# Patient Record
Sex: MALE | Race: WHITE
[De-identification: names, ages, dates, MRNs, and addresses within clinical notes are randomized per-mention and may not be internally consistent; named-entity substitution may affect disease eponyms.]

---

## 2018-03-12 ENCOUNTER — HOSPITAL ENCOUNTER (INPATIENT)
Dept: HOSPITAL 17 - NEPD | Age: 48
LOS: 10 days | Discharge: NURSING, REHAB OR LONG-TERM CARE | DRG: 885 | End: 2018-03-22
Attending: PSYCHIATRY & NEUROLOGY | Admitting: PSYCHIATRY & NEUROLOGY
Payer: MEDICARE

## 2018-03-12 VITALS — WEIGHT: 145.73 LBS | HEIGHT: 67 IN | BODY MASS INDEX: 22.87 KG/M2

## 2018-03-12 VITALS
OXYGEN SATURATION: 98 % | TEMPERATURE: 98.7 F | SYSTOLIC BLOOD PRESSURE: 124 MMHG | DIASTOLIC BLOOD PRESSURE: 83 MMHG | HEART RATE: 74 BPM | RESPIRATION RATE: 18 BRPM

## 2018-03-12 DIAGNOSIS — R25.1: ICD-10-CM

## 2018-03-12 DIAGNOSIS — Z81.8: ICD-10-CM

## 2018-03-12 DIAGNOSIS — E78.5: ICD-10-CM

## 2018-03-12 DIAGNOSIS — Z81.0: ICD-10-CM

## 2018-03-12 DIAGNOSIS — F20.9: Primary | ICD-10-CM

## 2018-03-12 DIAGNOSIS — E03.9: ICD-10-CM

## 2018-03-12 LAB
ALBUMIN SERPL-MCNC: 4.5 GM/DL (ref 3.4–5)
ALP SERPL-CCNC: 90 U/L (ref 45–117)
ALT SERPL-CCNC: 23 U/L (ref 12–78)
APAP SERPL-MCNC: (no result) MCG/ML (ref 10–30)
AST SERPL-CCNC: 21 U/L (ref 15–37)
BASOPHILS # BLD AUTO: 0 TH/MM3 (ref 0–0.2)
BASOPHILS NFR BLD: 0.5 % (ref 0–2)
BILIRUB SERPL-MCNC: 0.6 MG/DL (ref 0.2–1)
BUN SERPL-MCNC: 15 MG/DL (ref 7–18)
CALCIUM SERPL-MCNC: 9.1 MG/DL (ref 8.5–10.1)
CHLORIDE SERPL-SCNC: 104 MEQ/L (ref 98–107)
CREAT SERPL-MCNC: 0.95 MG/DL (ref 0.6–1.3)
EOSINOPHIL # BLD: 0.1 TH/MM3 (ref 0–0.4)
EOSINOPHIL NFR BLD: 1.2 % (ref 0–4)
ERYTHROCYTE [DISTWIDTH] IN BLOOD BY AUTOMATED COUNT: 13.7 % (ref 11.6–17.2)
GFR SERPLBLD BASED ON 1.73 SQ M-ARVRAT: 84 ML/MIN (ref 89–?)
GLUCOSE SERPL-MCNC: 87 MG/DL (ref 74–106)
HCO3 BLD-SCNC: 29.7 MEQ/L (ref 21–32)
HCT VFR BLD CALC: 43.8 % (ref 39–51)
HGB BLD-MCNC: 15.4 GM/DL (ref 13–17)
LYMPHOCYTES # BLD AUTO: 1.2 TH/MM3 (ref 1–4.8)
LYMPHOCYTES NFR BLD AUTO: 23.7 % (ref 9–44)
MCH RBC QN AUTO: 31.4 PG (ref 27–34)
MCHC RBC AUTO-ENTMCNC: 35.2 % (ref 32–36)
MCV RBC AUTO: 89.2 FL (ref 80–100)
MONOCYTE #: 0.3 TH/MM3 (ref 0–0.9)
MONOCYTES NFR BLD: 6.2 % (ref 0–8)
NEUTROPHILS # BLD AUTO: 3.5 TH/MM3 (ref 1.8–7.7)
NEUTROPHILS NFR BLD AUTO: 68.4 % (ref 16–70)
PLATELET # BLD: 165 TH/MM3 (ref 150–450)
PMV BLD AUTO: 8.3 FL (ref 7–11)
PROT SERPL-MCNC: 7.9 GM/DL (ref 6.4–8.2)
RBC # BLD AUTO: 4.91 MIL/MM3 (ref 4.5–5.9)
SODIUM SERPL-SCNC: 142 MEQ/L (ref 136–145)
WBC # BLD AUTO: 5.2 TH/MM3 (ref 4–11)

## 2018-03-12 PROCEDURE — 84443 ASSAY THYROID STIM HORMONE: CPT

## 2018-03-12 PROCEDURE — 80164 ASSAY DIPROPYLACETIC ACD TOT: CPT

## 2018-03-12 PROCEDURE — 80307 DRUG TEST PRSMV CHEM ANLYZR: CPT

## 2018-03-12 PROCEDURE — 85025 COMPLETE CBC W/AUTO DIFF WBC: CPT

## 2018-03-12 PROCEDURE — 80048 BASIC METABOLIC PNL TOTAL CA: CPT

## 2018-03-12 PROCEDURE — 99285 EMERGENCY DEPT VISIT HI MDM: CPT

## 2018-03-12 PROCEDURE — 84439 ASSAY OF FREE THYROXINE: CPT

## 2018-03-12 PROCEDURE — 80061 LIPID PANEL: CPT

## 2018-03-12 PROCEDURE — 84100 ASSAY OF PHOSPHORUS: CPT

## 2018-03-12 PROCEDURE — 93005 ELECTROCARDIOGRAM TRACING: CPT

## 2018-03-12 PROCEDURE — 83735 ASSAY OF MAGNESIUM: CPT

## 2018-03-12 PROCEDURE — 80053 COMPREHEN METABOLIC PANEL: CPT

## 2018-03-12 PROCEDURE — 83036 HEMOGLOBIN GLYCOSYLATED A1C: CPT

## 2018-03-12 NOTE — PD
HPI


Chief Complaint:  Psychiatric Symptoms


Time Seen by Provider:  21:51


Travel History


International Travel<30 days:  No


Contact w/Intl Traveler<30days:  No


Traveled to known affect area:  No





History of Present Illness


HPI


47-year-old white male presents emergency department from his long term under [facility] 

act by his psychologist.  The patient has history of paranoid schizophrenia.  

He is becoming increasingly agitated and aggressive towards staff and 

residents.  The patient according the [facility] act is at risk for self-harm or 

harm to others.  The patient here is a poor historian and unable to render any 

meaningful history.





PFSH


Past Medical History


*** Narrative Medical


Paranoid schizophrenia, hypothyroidism, hypercholesterolemia


Diabetes:  No


Patient Takes Glucophage:  No


Diminished Hearing:  No


Immunizations Current:  Yes


Schizophrenia:  Yes (PARANOID TYPE)


Tetanus Vaccination:  > 5 Years


Influenza Vaccination:  Yes





Past Surgical History


Surgical History:  No Previous Surgery





Social History


Alcohol Use:  No


Tobacco Use:  No


Substance Use:  No





Allergies-Medications


(Allergen,Severity, Reaction):  


Coded Allergies:  


     No Known Allergies (Unverified , 3/12/18)


Reported Meds & Prescriptions





Reported Meds & Active Scripts


Active


Reported


Abilify (Aripiprazole) 10 Mg Tab 10 Mg PO DAILY


Simvastatin 40 Mg Tab 40 Mg PO HS


Propranolol (Propranolol HCl) 10 Mg Tab 10 Mg PO DAILY


Levothyroxine (Levothyroxine Sodium) 25 Mcg Tab 25 Mcg PO DAILY


Latuda (Lurasidone) 80 Mg Tab 80 Mg PO DAILY


Haloperidol 10 Mg Tab 10 Mg PO DAILY


Lexapro (Escitalopram Oxalate) 20 Mg Tab 20 Mg PO DAILY


Aricept (Donepezil HCl) 10 Mg Tablet   


Benztropine (Benztropine Mesylate) 0.5 Mg Tab 2 Mg PO BID








Review of Systems


ROS Limitations:  Poor Historian





Physical Exam


Narrative


GENERAL: Well-nourished, well-developed patient.


SKIN: Warm and dry.


HEAD: Normocephalic and atraumatic.


EYES: No scleral icterus. No injection or drainage. 


ENT: No nasal drainage noted. Mucous membranes pink. Airway patent.


NECK: Supple, trachea midline.  Moves head freely without obvious discomfort.


CARDIOVASCULAR: Regular rate and rhythm without murmurs, gallops, or rubs. 


RESPIRATORY: Breath sounds equal bilaterally. No accessory muscle use.


GASTROINTESTINAL: Abdomen soft, non-tender, nondistended. 


EXTREMITIES: No cyanosis or edema.


BACK: Nontender without obvious deformity. No CVA tenderness.


NEURO: Patient is alert and oriented. no sensorimotor deficits.  Nonfocal.  

Normal speech.


PSYCH: Flat affect.  Patient is guarded.  No auditory or visual hallucinations.





Data


Data


Last Documented VS





Vital Signs








  Date Time  Temp Pulse Resp B/P (MAP) Pulse Ox O2 Delivery O2 Flow Rate FiO2


 


3/12/18 21:46 98.7 74 18 124/83 (97) 98   








Orders





 Orders


Complete Blood Count With Diff (3/12/18 22:00)


Comprehensive Metabolic Panel (3/12/18 22:00)


Thyroid Stimulating Hormone (3/12/18 22:00)


Valproic Acid (Depakene) (3/12/18 22:00)


Psych Screen (3/12/18 22:00)


Drug Screen, Random Urine (3/12/18 22:00)


Alcohol (Ethanol) (3/12/18 22:00)


Salicylates (Aspirin) (3/12/18 22:00)


Tylenol (Acetaminophen) (3/12/18 22:00)





Labs





Laboratory Tests








Test


  3/12/18


20:19 3/12/18


23:38


 


Blood Urea Nitrogen 15 MG/DL  


 


Creatinine 0.95 MG/DL  


 


Random Glucose 87 MG/DL  


 


Total Protein 7.9 GM/DL  


 


Albumin 4.5 GM/DL  


 


Calcium Level 9.1 MG/DL  


 


Alkaline Phosphatase 90 U/L  


 


Aspartate Amino Transf


(AST/SGOT) 21 U/L 


  


 


 


Alanine Aminotransferase


(ALT/SGPT) 23 U/L 


  


 


 


Total Bilirubin 0.6 MG/DL  


 


Sodium Level 142 MEQ/L  


 


Potassium Level 3.6 MEQ/L  


 


Chloride Level 104 MEQ/L  


 


Carbon Dioxide Level 29.7 MEQ/L  


 


Anion Gap 8 MEQ/L  


 


Estimat Glomerular Filtration


Rate 84 ML/MIN 


  


 


 


Thyroid Stimulating Hormone


3rd Gen 5.550 uIU/ML 


  


 


 


Salicylates Level


  LESS THAN 1.7


MG/DL 


 


 


Acetaminophen Level


  LESS THAN 2.0


MCG/ML 


 


 


Valproic Acid (Depakene) Level 4 MCG/ML  


 


Ethyl Alcohol Level


  LESS THAN 3


MG/DL 


 


 


White Blood Count  5.2 TH/MM3 


 


Red Blood Count  4.91 MIL/MM3 


 


Hemoglobin  15.4 GM/DL 


 


Hematocrit  43.8 % 


 


Mean Corpuscular Volume  89.2 FL 


 


Mean Corpuscular Hemoglobin  31.4 PG 


 


Mean Corpuscular Hemoglobin


Concent 


  35.2 % 


 


 


Red Cell Distribution Width  13.7 % 


 


Platelet Count  165 TH/MM3 


 


Mean Platelet Volume  8.3 FL 


 


Neutrophils (%) (Auto)  68.4 % 


 


Lymphocytes (%) (Auto)  23.7 % 


 


Monocytes (%) (Auto)  6.2 % 


 


Eosinophils (%) (Auto)  1.2 % 


 


Basophils (%) (Auto)  0.5 % 


 


Neutrophils # (Auto)  3.5 TH/MM3 


 


Lymphocytes # (Auto)  1.2 TH/MM3 


 


Monocytes # (Auto)  0.3 TH/MM3 


 


Eosinophils # (Auto)  0.1 TH/MM3 


 


Basophils # (Auto)  0.0 TH/MM3 


 


CBC Comment  DIFF FINAL 


 


Differential Comment   











MDM


Medical Decision Making


Medical Screen Exam Complete:  Yes


Emergency Medical Condition:  Yes


Medical Record Reviewed:  Yes


Differential Diagnosis


MDM: High


Differential diagnoses: Schizophrenia, schizoaffective disorder, bipolar, 

anxiety, depression, adjustment reaction, mood disorder NOS, ODD, depressive 

disorder NOS, dementia, dementia with agitation, psychosis NOS, substance 

induced mood disorder, DMDD, Asperger syndrome, infection,electrolyte 

abnormality, malingering.


Narrative Course


Mental health screening discussed with the patient.  Psychiatric screen ordered.





Patient has been medically cleared.





This is medical clearance for psychiatric admission





Diagnosis





 Primary Impression:  


 Medical clearance for psychiatric admission


Condition:  Nick Green Mar 12, 2018 22:07

## 2018-03-13 VITALS
OXYGEN SATURATION: 98 % | SYSTOLIC BLOOD PRESSURE: 116 MMHG | DIASTOLIC BLOOD PRESSURE: 68 MMHG | RESPIRATION RATE: 20 BRPM | HEART RATE: 72 BPM

## 2018-03-13 VITALS
HEART RATE: 79 BPM | DIASTOLIC BLOOD PRESSURE: 77 MMHG | SYSTOLIC BLOOD PRESSURE: 133 MMHG | OXYGEN SATURATION: 100 % | RESPIRATION RATE: 20 BRPM | TEMPERATURE: 97.3 F

## 2018-03-13 VITALS
HEART RATE: 75 BPM | DIASTOLIC BLOOD PRESSURE: 60 MMHG | OXYGEN SATURATION: 97 % | SYSTOLIC BLOOD PRESSURE: 120 MMHG | RESPIRATION RATE: 19 BRPM | TEMPERATURE: 97.3 F

## 2018-03-13 VITALS
HEART RATE: 70 BPM | OXYGEN SATURATION: 97 % | SYSTOLIC BLOOD PRESSURE: 110 MMHG | RESPIRATION RATE: 18 BRPM | DIASTOLIC BLOOD PRESSURE: 60 MMHG

## 2018-03-13 RX ADMIN — NICOTINE SCH PATCH: 21 PATCH, EXTENDED RELEASE TOPICAL at 14:00

## 2018-03-13 RX ADMIN — PRAVASTATIN SODIUM SCH MG: 80 TABLET ORAL at 20:13

## 2018-03-13 RX ADMIN — BENZTROPINE MESYLATE SCH MG: 2 TABLET ORAL at 20:13

## 2018-03-13 NOTE — HHI.HP
Provisional Diagnosis


Admission Date





[place] I.


Chronic paranoid schizophrenia, acute exacerbation


Axis II.


Deferred


Axis III.


Hypothyroidism





                               Certification of Person's Competence 


                           To Provide Express and Informed Consent





I have personally examined Duane Stefaniak , a person being served at [facility] on, Mar 13, 2018 11:22.


Express and informed consent means consent voluntarily given in writing, by a 

competent person, after sufficient explanation and disclosure of the subject 

matter involved to enable the person to make a knowing and willful decision 

without any element of force, fraud, deceit, duress, or other form of 

constraint or coercion.





This person is 18 years of age or older, is not now known to be incompetent to 

consent to treatment with a guardian advocate, and does not have a health care 

surrogate or proxy currently making medical treatment decisions.  I have found 

this person to be one of the following:





[] Competent to provide express and informed consent, as defined above, for 

voluntary admission to this facility and is competent to provide express and 

informed consent for treatment.  He/she has the consistent capacity to make 

well reasoned, willful, and knowing decisions concerning his or her medical or 

mental health treatment.  The person fully and consistently understands the 

purpose of the admission for examination/placement and is fully capable of 

personally exercising all rights [place]





[] Incompetent to provide express and informed consent to voluntary admission, 

and this is incompetent to provide express and informed consent to treatment.  

The person must be transferred to involuntary status and a petition for a 

guardian advocate filed with the Circuit Court.





[x] Refusing to provide express and informed consent to voluntary admission but 

is competent to provide express and informed consent for treatment.  The person 

must be discharged or transferred to involuntary status.





Form shall be completed within 24 hours of a person's arrival at the receiving 

facility and filed in the clinical record of each person:


1. Admitted on a voluntary basis


2. Permitted to provide express and informed consent to his/her own treatment


3. Allowed to transfer from involuntary to voluntary status


4. Prior to permitting a person to consent to his or her own treatment after 

having been previously found incompetent to consent to treatment.





History of Present Illness


Capacity:  Lacks Capacity


HPI


The patient is a 47-year-old  man, domiciled in a residential facility 

[facility], , first time at [facility], with documented psychiatric 

history of paranoid schizophrenia, multiple psychiatric hospitalizations, he is 

on multiple psychotropics Benztropine (Benztropine Mesylate) 0.5 Mg Tab 2 Mg PO 

BID, Aricept (Donepezil HCl) 10 Mg Table, Lexapro (Escitalopram Oxalate) 20 Mg 

Tab 20 Mg PO DAILY, Haloperidol 10 Mg Tab 10 Mg PO DAILY, Latuda (Lurasidone) 

80 Mg Tab 80 Mg PO DAILY, Abilify (Aripiprazole) 10 Mg Tab 10 Mg PO DAILY, 

medical history hypothyroidism, who   presents emergency department from his 

shelter under [facility] act by his psychologist.  He is becoming increasingly agitated 

and aggressive towards staff and residents.  The patient according the Baker 

act is at risk for self-harm or harm to others.  On psychiatric evaluation the 

patient is nonverbal, very oppositional and resistant, refusing to cooperate 

and provide information for the psychiatric assessment at this moment.  As per 

nurse in charge the patient has been calm, but no cooperating, and at time 

stating that he wants to go back home.  This is the first time that the patient 

is seeing in [facility] psychiatry.  At the moment of this evaluation 

unfortunately there is no collateral information available.





Review of Systems


ROS Limitations:  Uncooperative, Refused





Past Psych History


Violence risk - others (6 mos)


Increased


Violence risk - self (6 mos)


Increased





Past Family Social History


Coded Allergies:  


     No Known Allergies (Unverified , 3/13/18)


Reported Medications


Aripiprazole (Abilify) 10 Mg Tab, 10 MG PO DAILY, #30 TAB 0 Refills


   3/12/18


Simvastatin (Simvastatin) 40 Mg Tab, 40 MG PO HS for Cholesterol Management, #

30 TAB 0 Refills


   3/12/18


Propranolol (Propranolol) 10 Mg Tab, 10 MG PO DAILY, #60 TAB 0 Refills


   3/12/18


Levothyroxine (Levothyroxine) 25 Mcg Tab, 25 MCG PO DAILY for Thyroid, #30 TAB 

0 Refills


   3/12/18


Lurasidone (Latuda) 80 Mg Tab, 80 MG PO DAILY, #30 TAB 0 Refills


   3/12/18


Haloperidol (Haloperidol) 10 Mg Tab, 10 MG PO DAILY, TAB 0 Refills


   3/12/18


Escitalopram (Lexapro) 20 Mg Tab, 20 MG PO DAILY, #30 TAB 0 Refills


   3/12/18


Donepezil HCl (Aricept) 10 Mg Tablet


   3/12/18


Benztropine (Benztropine) 0.5 Mg Tab, 2 MG PO BID, #60 TAB 0 Refills


   3/12/18





Current Medications








 Medications


  (Trade)  Dose


 Ordered  Sig/Loc


 Route  Start Time


 Stop Time Status Last Admin


 


  (Abilify)  10 mg  DAILY


 PO  3/13/18 11:15


   UNV  


 


 


  (Cogentin)  2 mg  BID


 PO  3/13/18 21:00


   UNV  


 


 


  (Lexapro)  20 mg  DAILY


 PO  3/14/18 09:00


   UNV  


 


 


  (Synthroid)  25 mcg  DAILY


 PO  3/14/18 09:00


   UNV  


 


 


  (Latuda)  80 mg  DAILY


 PO  3/14/18 09:00


   UNV  


 


 


  (Inderal)  10 mg  DAILY


 PO  3/14/18 09:00


   UNV  


 


 


 Non-Formulary


 Medication  40 mg  HS


 PO  3/13/18 21:00


   UNV  


 


 


  (Ativan)  1 mg  Q6H  PRN


 PO  3/13/18 11:15


   UNV  


 


 


  (Ativan Inj)  1 mg  Q6H  PRN


 IM  3/13/18 11:15


   UNV  


 


 


  (Ativan)  0.5 mg  Q12H  PRN


 PO  3/13/18 11:15


   UNV  


 


 


  (Ativan Inj)  0.5 mg  Q12H  PRN


 IM  3/13/18 11:15


   UNV  


 


 


  (Tylenol)  650 mg  Q4H  PRN


 PO  3/13/18 11:15


   UNV  


 


 


  (Milk Of


 Magnesia Liq)  30 ml  DAILY  PRN


 PO  3/13/18 11:15


   UNV  


 


 


  (Mag-Al Plus


 Susp Liq)  30 ml  Q6H  PRN


 PO  3/13/18 11:15


   UNV  


 


 


  (Habitrol 21 Mg


 Patch.24 Hr)  1 patch  DAILY


 T-DERMAL  3/13/18 11:15


   UNV  


 











Physical Exam


Vital Signs





Vital Signs








  Date Time  Temp Pulse Resp B/P (MAP) Pulse Ox O2 Delivery O2 Flow Rate FiO2


 


3/13/18 08:26  72 20 116/68 (84) 98 Room Air  


 


3/12/18 21:46 98.7       








Lab Results











Test


  3/12/18


20:19 3/12/18


23:38


 


Blood Urea Nitrogen 15 MG/DL  


 


Creatinine 0.95 MG/DL  


 


Random Glucose 87 MG/DL  


 


Total Protein 7.9 GM/DL  


 


Albumin 4.5 GM/DL  


 


Calcium Level 9.1 MG/DL  


 


Alkaline Phosphatase 90 U/L  


 


Aspartate Amino Transf


(AST/SGOT) 21 U/L 


  


 


 


Alanine Aminotransferase


(ALT/SGPT) 23 U/L 


  


 


 


Total Bilirubin 0.6 MG/DL  


 


Sodium Level 142 MEQ/L  


 


Potassium Level 3.6 MEQ/L  


 


Chloride Level 104 MEQ/L  


 


Carbon Dioxide Level 29.7 MEQ/L  


 


Anion Gap 8 MEQ/L  


 


Estimat Glomerular Filtration


Rate 84 ML/MIN 


  


 


 


Thyroid Stimulating Hormone


3rd Gen 5.550 uIU/ML 


  


 


 


Salicylates Level


  LESS THAN 1.7


MG/DL 


 


 


Acetaminophen Level


  LESS THAN 2.0


MCG/ML 


 


 


Valproic Acid (Depakene) Level 4 MCG/ML  


 


Ethyl Alcohol Level


  LESS THAN 3


MG/DL 


 


 


White Blood Count  5.2 TH/MM3 


 


Red Blood Count  4.91 MIL/MM3 


 


Hemoglobin  15.4 GM/DL 


 


Hematocrit  43.8 % 


 


Mean Corpuscular Volume  89.2 FL 


 


Mean Corpuscular Hemoglobin  31.4 PG 


 


Mean Corpuscular Hemoglobin


Concent 


  35.2 % 


 


 


Red Cell Distribution Width  13.7 % 


 


Platelet Count  165 TH/MM3 


 


Mean Platelet Volume  8.3 FL 


 


Neutrophils (%) (Auto)  68.4 % 


 


Lymphocytes (%) (Auto)  23.7 % 


 


Monocytes (%) (Auto)  6.2 % 


 


Eosinophils (%) (Auto)  1.2 % 


 


Basophils (%) (Auto)  0.5 % 


 


Neutrophils # (Auto)  3.5 TH/MM3 


 


Lymphocytes # (Auto)  1.2 TH/MM3 


 


Monocytes # (Auto)  0.3 TH/MM3 


 


Eosinophils # (Auto)  0.1 TH/MM3 


 


Basophils # (Auto)  0.0 TH/MM3 


 


CBC Comment  DIFF FINAL 


 


Differential Comment   











Mental Status Examination


Appearance:  Appropriate


Consciousness:  Alert


Orientation:  Person


Speech:  Hesitant


Delusion Type:  Paranoid


Insight:  Poor


Judgment:  Poor





Assessment & Plan


Problem List:  


(1) Schizophrenia


ICD Codes:  F20.9 - Schizophrenia, unspecified


Assessment & Plan:  On psychiatric evaluation today the patient presents 

oppositional, resistant, refusing to cooperate with the psychiatric assessment.

  Patient seems to be internally preoccupied and paranoid.  As per [facility] at the 

documentation the patient has been aggressive in his residential facility, 

agitated, making homicidal statements.  Collateral information from residential 

facility was not possible at this moment.  Patient definitely has an elevated 

risk of danger to self and others.  He needs psychiatric hospitalization for 

stabilization.  I will start some of his multiple outpatient psychotropics 

until his psychotropic regimen could be actually confirmed with his outpatient 

psychiatrist.  We will restart Abilify 10 mg, benztropine 2 mg, Depakote 250 mg

, Aricept 10 mg, Latuda 80 mg.  Hold the rest of psychotropics.  Ordered 

Depakote levels.  Order EKG.  intervention for collateral 

information, psychosocial assessment, group and individual therapies, to 

coordinate safe discharge.  Transfer patient to [address] unit. 





Assessment & Plan


Estimated LOS:  days











Geovanny Dsouza MD Mar 13, 2018 11:32

## 2018-03-14 VITALS
TEMPERATURE: 97.1 F | HEART RATE: 83 BPM | RESPIRATION RATE: 18 BRPM | DIASTOLIC BLOOD PRESSURE: 67 MMHG | SYSTOLIC BLOOD PRESSURE: 120 MMHG

## 2018-03-14 VITALS
SYSTOLIC BLOOD PRESSURE: 123 MMHG | OXYGEN SATURATION: 98 % | DIASTOLIC BLOOD PRESSURE: 66 MMHG | TEMPERATURE: 98 F | RESPIRATION RATE: 17 BRPM | HEART RATE: 63 BPM

## 2018-03-14 LAB
BUN SERPL-MCNC: 14 MG/DL (ref 7–18)
CALCIUM SERPL-MCNC: 9 MG/DL (ref 8.5–10.1)
CHLORIDE SERPL-SCNC: 98 MEQ/L (ref 98–107)
CHOLEST SERPL-MCNC: 138 MG/DL (ref 120–200)
CHOLESTEROL/ HDL RATIO: 3.21 RATIO
CREAT SERPL-MCNC: 0.78 MG/DL (ref 0.6–1.3)
GFR SERPLBLD BASED ON 1.73 SQ M-ARVRAT: 106 ML/MIN (ref 89–?)
GLUCOSE SERPL-MCNC: 88 MG/DL (ref 74–106)
HBA1C MFR BLD: 5.1 % (ref 4.3–6)
HCO3 BLD-SCNC: 26.5 MEQ/L (ref 21–32)
HDLC SERPL-MCNC: 42.9 MG/DL (ref 40–60)
LDLC SERPL-MCNC: 81 MG/DL (ref 0–99)
SODIUM SERPL-SCNC: 136 MEQ/L (ref 136–145)
TRIGL SERPL-MCNC: 71 MG/DL (ref 42–150)

## 2018-03-14 RX ADMIN — LEVOTHYROXINE SODIUM SCH MCG: 25 TABLET ORAL at 05:32

## 2018-03-14 RX ADMIN — BENZTROPINE MESYLATE SCH MG: 2 TABLET ORAL at 20:32

## 2018-03-14 RX ADMIN — PRAVASTATIN SODIUM SCH MG: 80 TABLET ORAL at 20:32

## 2018-03-14 RX ADMIN — PROPRANOLOL HYDROCHLORIDE SCH MG: 10 TABLET ORAL at 09:12

## 2018-03-14 RX ADMIN — BENZTROPINE MESYLATE SCH MG: 2 TABLET ORAL at 09:12

## 2018-03-14 RX ADMIN — ESCITALOPRAM OXALATE SCH MG: 20 TABLET ORAL at 09:12

## 2018-03-14 RX ADMIN — POTASSIUM CHLORIDE ONE MEQ: 750 TABLET, FILM COATED, EXTENDED RELEASE ORAL at 12:45

## 2018-03-14 RX ADMIN — LURASIDONE HYDROCHLORIDE SCH MG: 80 TABLET, FILM COATED ORAL at 09:12

## 2018-03-14 RX ADMIN — POTASSIUM CHLORIDE ONE MEQ: 750 TABLET, FILM COATED, EXTENDED RELEASE ORAL at 13:55

## 2018-03-14 RX ADMIN — NICOTINE SCH PATCH: 21 PATCH, EXTENDED RELEASE TOPICAL at 09:00

## 2018-03-14 RX ADMIN — NICOTINE SCH PATCH: 21 PATCH, EXTENDED RELEASE TOPICAL at 09:12
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
## 2018-03-15 VITALS
DIASTOLIC BLOOD PRESSURE: 60 MMHG | HEART RATE: 18 BPM | RESPIRATION RATE: 18 BRPM | OXYGEN SATURATION: 99 % | SYSTOLIC BLOOD PRESSURE: 127 MMHG | TEMPERATURE: 96.7 F

## 2018-03-15 VITALS
TEMPERATURE: 98.6 F | OXYGEN SATURATION: 96 % | HEART RATE: 82 BPM | RESPIRATION RATE: 18 BRPM | SYSTOLIC BLOOD PRESSURE: 147 MMHG | DIASTOLIC BLOOD PRESSURE: 66 MMHG

## 2018-03-15 RX ADMIN — BENZTROPINE MESYLATE SCH MG: 2 TABLET ORAL at 08:51

## 2018-03-15 RX ADMIN — PRAVASTATIN SODIUM SCH MG: 80 TABLET ORAL at 20:52

## 2018-03-15 RX ADMIN — ESCITALOPRAM OXALATE SCH MG: 20 TABLET ORAL at 08:51

## 2018-03-15 RX ADMIN — BENZTROPINE MESYLATE SCH MG: 2 TABLET ORAL at 20:51

## 2018-03-15 RX ADMIN — PROPRANOLOL HYDROCHLORIDE SCH MG: 10 TABLET ORAL at 08:51

## 2018-03-15 RX ADMIN — NICOTINE SCH PATCH: 21 PATCH, EXTENDED RELEASE TOPICAL at 09:00

## 2018-03-15 RX ADMIN — LEVOTHYROXINE SODIUM SCH MCG: 25 TABLET ORAL at 06:25

## 2018-03-15 RX ADMIN — LURASIDONE HYDROCHLORIDE SCH MG: 80 TABLET, FILM COATED ORAL at 08:51

## 2018-03-15 NOTE — HHI.PYPN
Subjective


Remarks


Patient seen and examined with nurse.  Chart reviewed.  Case discussed with 

nursing staff reports the patient refused his potassium and remains quite 

paranoid.  He apparently sat in the day area for much of the afternoon facing 

away from peers, although he did eventually turn in to the group and watch some 

TV.  No aggressive behavior noted.  On my exam, patient exhibits poor eye 

contact.  He remains quite guarded and internally stimulated.  He denies any SI 

or HI.  Denies side effects from medications.  No physical complaints.





Review of Systems


ROS Limitations:  Psychotic, Poor Historian


Except as stated in HPI:  all other systems reviewed are Neg





Mental Status Examination


Appearance:  Disheveled


Consciousness:  Alert


Orientation:  Person, Place (at least)


Motor Activity:  Normal gait, Other (mild resting tremor persists.  No other 

motor abnormalities noted.)


Speech:  Hesitant


Language:  Adequate


Fund of Knowledge:  Inadequate


Attention and Concentration:  Easily Distracted


Memory:  Impaired (psychosis continues to interfere)


Mood:  Other (calm)


Affect:  Flat


Thought Process & Associations:  Linear


Thought Content:  Delusional


Hallucination Type:  Other (appears internally stimulated)


Delusion Type:  Other (guarded)


Suicidal Ideation:  No


Homicidal Ideation:  No


Insight:  Poor


Judgment:  Poor





Results


Labs


Labs reviewed.  Free T4 within normal limits.  EKG reveals sinus rhythm with 

incomplete right bundle branch block with a QTc of 414 ms, not prolonged.


Vitals/IOs





Vital Signs








  Date Time  Temp Pulse Resp B/P (MAP) Pulse Ox O2 Delivery O2 Flow Rate FiO2


 


3/15/18 06:10 96.7 -1-8- in error.  Actual pulse rate is 70/m. 18 127/60 (82) 

99   


 


3/13/18 11:15      Room Air  











Assessment & Plan


Problem List:  


(1) Schizophrenia


ICD Codes:  F20.9 - Schizophrenia, unspecified


Assessment & Plan


Titrate Abilify to 20 mg daily to target psychosis.  Titrate Cogentin to 2 mg 3 

times daily for possible drug-induced parkinsonism.  I have reordered the 

potassium supplement, this time as an effervescent powder in hopes of improving 

adherence with this medication.  Continue to monitor on the high acuity unit.  

Continue other medications and care as ordered.


Justification for Cont. Inpt.


Medication changes.  Impairment in reality construction.  High risk for 

decompensation in less restrictive environment.


Discharge Planning


Pending psychiatric stabilization.  Plan is presently for return to facility 

with outpatient psychiatric follow-up and counselor tells me that facility will 

accept him back once psychiatrically stable.


Request HC Surrog/Guard Advoc?:  Yes





Problem Qualifiers





(1) Schizophrenia:  


Qualified Codes:  F20.0 - Paranoid schizophrenia








All Spring MD Mar 15, 2018 10:12

## 2018-03-15 NOTE — EKG
Date Performed: 03/14/2018       Time Performed: 14:16:16

 

PTAGE:      47 years

 

EKG:      Sinus rhythm 

 

 INCOMPLETE RIGHT BUNDLE BRANCH BLOCK BORDERLINE ECG 

 

NO PREVIOUS TRACING            

 

DOCTOR:   Reena Puentes  Interpretating Date/Time  03/15/2018 13:50:00

## 2018-03-16 VITALS
RESPIRATION RATE: 17 BRPM | DIASTOLIC BLOOD PRESSURE: 55 MMHG | TEMPERATURE: 96.8 F | HEART RATE: 92 BPM | OXYGEN SATURATION: 98 % | SYSTOLIC BLOOD PRESSURE: 110 MMHG

## 2018-03-16 VITALS
RESPIRATION RATE: 17 BRPM | OXYGEN SATURATION: 98 % | HEART RATE: 66 BPM | TEMPERATURE: 96.7 F | DIASTOLIC BLOOD PRESSURE: 72 MMHG | SYSTOLIC BLOOD PRESSURE: 117 MMHG

## 2018-03-16 RX ADMIN — PRAVASTATIN SODIUM SCH MG: 80 TABLET ORAL at 20:26

## 2018-03-16 RX ADMIN — BENZTROPINE MESYLATE SCH MG: 2 TABLET ORAL at 20:29

## 2018-03-16 RX ADMIN — BENZTROPINE MESYLATE SCH MG: 2 TABLET ORAL at 15:00

## 2018-03-16 RX ADMIN — HALOPERIDOL SCH MG: 10 TABLET ORAL at 20:26

## 2018-03-16 RX ADMIN — LURASIDONE HYDROCHLORIDE SCH MG: 80 TABLET, FILM COATED ORAL at 09:00

## 2018-03-16 RX ADMIN — LEVOTHYROXINE SODIUM SCH MCG: 25 TABLET ORAL at 05:48

## 2018-03-16 RX ADMIN — ESCITALOPRAM OXALATE SCH MG: 20 TABLET ORAL at 09:00

## 2018-03-16 RX ADMIN — NICOTINE SCH PATCH: 21 PATCH, EXTENDED RELEASE TOPICAL at 09:00

## 2018-03-16 RX ADMIN — PROPRANOLOL HYDROCHLORIDE SCH MG: 10 TABLET ORAL at 09:00

## 2018-03-16 RX ADMIN — BENZTROPINE MESYLATE SCH MG: 2 TABLET ORAL at 09:00

## 2018-03-16 NOTE — HHI.PYPN
Subjective


Remarks


Patient seen and examined with nurse.  Chart reviewed.  Case discussed with 

nursing staff.  Case discussed in treatment team.  On my examination today, 

patient is seclusive to room.  He is quite guarded and internally stimulated.  

He is electively mute but does answer questions by nodding his head.  Denies SI 

or HI.  Refusing meds.  Also refused breakfast and lunch yesterday but did 

accept dinner.  Resting tremor, present at admission, is no worse today.  No 

evident med side effects.  No physical complaints reported.





Review of Systems


ROS Limitations:  Psychotic, Poor Historian, Other (electively mute)


Other


Limited ROS because of above





Mental Status Examination


Appearance:  Disheveled


Consciousness:  Alert


Orientation:  Person (responds to name)


Motor Activity:  Other (mild resting tremor again persists.  No other motor 

abnormalities noted.)


Speech:  Other (electively mute)


Attention and Concentration:  Easily Distracted


Affect:  Other (dysphoric)


Hallucination Type:  Other (int stim)


Delusion Type:  Other (remains quite guarded)


Suicidal Ideation:  No (No SI voiced)


Homicidal Ideation:  No (No HI voiced)


Insight:  Poor


Judgment:  Poor





Results


Labs


Labs reviewed.  No new labs.


Vitals/IOs





Vital Signs








  Date Time  Temp Pulse Resp B/P (MAP) Pulse Ox O2 Delivery O2 Flow Rate FiO2


 


3/16/18 06:07 96.7 66 17 117/72 (87) 98   


 


3/13/18 11:15      Room Air  











Assessment & Plan


Problem List:  


(1) Schizophrenia


ICD Codes:  F20.9 - Schizophrenia, unspecified


Assessment & Plan


Patient now refusing all psychotropics.  Still taking some food/fluids, but 

this is worsening.  Discontinue Latuda and Abilify (which did not seem to be 

helping much anyway) and start Haldol, PO with IM backup.  We already have 

consent for Haldol.  Given severity of patient's symptoms and given that 

patient is not antipsychotic naive, I will start at a dose of Haldol 10mg BID.  

Please consider titrating this over the weekend to target psychosis.  Encourage 

food and fluids.  Check BMP, Mg, PO4 in morning to ensure decreased PO intake 

is not affecting electrolytes or renal function.  Consult dietitian.  Continue 

to monitor on high acuity unit.  Continue other care as ordered.


Justification for Cont. Inpt.


Medication changes.  Impairment in reality construction.  Impairment in self-

care.  High risk for decompensation in less restrictive environment.


Discharge Planning


Return to facility once psychiatrically stable.


Request HC Surrog/Guard Advoc?:  Yes





Problem Qualifiers





(1) Schizophrenia:  


Qualified Codes:  F20.0 - Paranoid schizophrenia








All Spring MD Mar 16, 2018 10:04

## 2018-03-17 VITALS
HEART RATE: 58 BPM | DIASTOLIC BLOOD PRESSURE: 63 MMHG | SYSTOLIC BLOOD PRESSURE: 118 MMHG | OXYGEN SATURATION: 100 % | RESPIRATION RATE: 17 BRPM | TEMPERATURE: 97.1 F

## 2018-03-17 RX ADMIN — HALOPERIDOL SCH MG: 10 TABLET ORAL at 09:04

## 2018-03-17 RX ADMIN — BENZTROPINE MESYLATE SCH MG: 2 TABLET ORAL at 09:05

## 2018-03-17 RX ADMIN — BENZTROPINE MESYLATE SCH MG: 2 TABLET ORAL at 15:00

## 2018-03-17 RX ADMIN — PRAVASTATIN SODIUM SCH MG: 80 TABLET ORAL at 20:06

## 2018-03-17 RX ADMIN — ESCITALOPRAM OXALATE SCH MG: 20 TABLET ORAL at 09:04

## 2018-03-17 RX ADMIN — BENZTROPINE MESYLATE SCH MG: 2 TABLET ORAL at 20:06

## 2018-03-17 RX ADMIN — HALOPERIDOL SCH MG: 10 TABLET ORAL at 20:06

## 2018-03-17 RX ADMIN — NICOTINE SCH PATCH: 21 PATCH, EXTENDED RELEASE TOPICAL at 09:00

## 2018-03-17 RX ADMIN — LEVOTHYROXINE SODIUM SCH MCG: 25 TABLET ORAL at 06:04

## 2018-03-17 RX ADMIN — PROPRANOLOL HYDROCHLORIDE SCH MG: 10 TABLET ORAL at 09:04

## 2018-03-17 NOTE — HHI.PYPN
Subjective


Remarks


Patient was seen and case discussed with nursing.  Note reviewed from 

yesterday.  Patient is tolerating his Haldol well with no EPS.  He remains 

psychotic and irritable.  Thought blocking is evident and he is responding to 

voices saying they are "different."  He denies they're command in nature but 

would not specify the content.  Affect is flat.





Mental Status Examination


Appearance:  Disheveled


Consciousness:  Alert


Orientation:  Person (responds to name)


Motor Activity:  Other (mild resting tremor again persists.  No other motor 

abnormalities noted.)


Speech:  Hesitant, Slow


Attention and Concentration:  Easily Distracted


Mood:  Angry, Irritable


Affect:  Other (dysphoric)


Thought Content:  Delusional


Hallucination Type:  Auditory, Other (int stim)


Delusion Type:  Other (remains quite guarded)


Suicidal Ideation:  No (No SI voiced)


Homicidal Ideation:  No (No HI voiced)


Insight:  Poor


Judgment:  Poor





Results


Vitals/IOs





Vital Signs








  Date Time  Temp Pulse Resp B/P (MAP) Pulse Ox O2 Delivery O2 Flow Rate FiO2


 


3/17/18 06:25 97.1 58 17 118/63 (81) 100   


 


3/13/18 11:15      Room Air  











Assessment & Plan


Problem List:  


(1) Schizophrenia


ICD Codes:  F20.9 - Schizophrenia, unspecified


Assessment & Plan


Patient is started on Haldol yesterday.  We will continue the same dose today 

and increase tomorrow


Justification for Cont. Inpt.


Patient will decompensate in a less restrictive setting


Request HC Surrog/Guard Advoc?:  Yes





Problem Qualifiers





(1) Schizophrenia:  


Qualified Codes:  F20.0 - Paranoid schizophrenia








Nelson Huggins DO Mar 17, 2018 16:45

## 2018-03-18 VITALS
SYSTOLIC BLOOD PRESSURE: 109 MMHG | OXYGEN SATURATION: 97 % | RESPIRATION RATE: 18 BRPM | DIASTOLIC BLOOD PRESSURE: 61 MMHG | HEART RATE: 98 BPM | TEMPERATURE: 97.4 F

## 2018-03-18 VITALS
OXYGEN SATURATION: 98 % | SYSTOLIC BLOOD PRESSURE: 109 MMHG | DIASTOLIC BLOOD PRESSURE: 66 MMHG | TEMPERATURE: 98 F | HEART RATE: 56 BPM | RESPIRATION RATE: 17 BRPM

## 2018-03-18 RX ADMIN — HALOPERIDOL SCH MG: 10 TABLET ORAL at 12:38

## 2018-03-18 RX ADMIN — BENZTROPINE MESYLATE SCH MG: 2 TABLET ORAL at 12:38

## 2018-03-18 RX ADMIN — BENZTROPINE MESYLATE SCH MG: 2 TABLET ORAL at 09:45

## 2018-03-18 RX ADMIN — ESCITALOPRAM OXALATE SCH MG: 20 TABLET ORAL at 09:45

## 2018-03-18 RX ADMIN — PRAVASTATIN SODIUM SCH MG: 80 TABLET ORAL at 20:25

## 2018-03-18 RX ADMIN — HALOPERIDOL SCH MG: 10 TABLET ORAL at 17:49

## 2018-03-18 RX ADMIN — LEVOTHYROXINE SODIUM SCH MCG: 25 TABLET ORAL at 05:08

## 2018-03-18 RX ADMIN — HALOPERIDOL SCH MG: 10 TABLET ORAL at 09:45

## 2018-03-18 RX ADMIN — BENZTROPINE MESYLATE SCH MG: 2 TABLET ORAL at 20:25

## 2018-03-18 RX ADMIN — NICOTINE SCH PATCH: 21 PATCH, EXTENDED RELEASE TOPICAL at 09:00

## 2018-03-18 RX ADMIN — PROPRANOLOL HYDROCHLORIDE SCH MG: 10 TABLET ORAL at 09:45

## 2018-03-18 NOTE — HHI.PYPN
Subjective


Remarks


Patient was seen and case discussed with nursing.  She continues with profound 

thought blocking and internal stimulation.  He is flat and quite irritable.  

Seclusive to self.  Refuses the interview after a couple questions





Mental Status Examination


Appearance:  Disheveled


Consciousness:  Alert


Orientation:  Person (responds to name)


Motor Activity:  Other (mild resting tremor again persists.  No other motor 

abnormalities noted.)


Speech:  Hesitant, Slow


Attention and Concentration:  Easily Distracted


Mood:  Angry, Irritable


Affect:  Other (dysphoric)


Thought Content:  Delusional


Hallucination Type:  Auditory (admits but will not specify), Other (int stim)


Delusion Type:  Other (remains quite guarded)


Suicidal Ideation:  No (No SI voiced)


Homicidal Ideation:  No (No HI voiced)


Insight:  Poor


Judgment:  Poor





Results


Vitals/IOs





Vital Signs








  Date Time  Temp Pulse Resp B/P (MAP) Pulse Ox O2 Delivery O2 Flow Rate FiO2


 


3/18/18 06:07 98.0 56 17 109/66 (80) 98   











Assessment & Plan


Problem List:  


(1) Schizophrenia


ICD Codes:  F20.9 - Schizophrenia, unspecified


Assessment & Plan


Increase Haldol to 10 mg by mouth 3 times a day


Justification for Cont. Inpt.


Patient will decompensate in a less restrictive setting


Request HC Surrog/Guard Advoc?:  Yes





Problem Qualifiers





(1) Schizophrenia:  


Qualified Codes:  F20.0 - Paranoid schizophrenia








Nelson Huggins DO Mar 18, 2018 12:12

## 2018-03-19 VITALS
RESPIRATION RATE: 18 BRPM | SYSTOLIC BLOOD PRESSURE: 89 MMHG | DIASTOLIC BLOOD PRESSURE: 58 MMHG | HEART RATE: 84 BPM | TEMPERATURE: 97.6 F | OXYGEN SATURATION: 98 %

## 2018-03-19 VITALS
DIASTOLIC BLOOD PRESSURE: 66 MMHG | OXYGEN SATURATION: 97 % | HEART RATE: 59 BPM | RESPIRATION RATE: 18 BRPM | TEMPERATURE: 97.1 F | SYSTOLIC BLOOD PRESSURE: 112 MMHG

## 2018-03-19 RX ADMIN — BENZTROPINE MESYLATE SCH MG: 2 TABLET ORAL at 20:27

## 2018-03-19 RX ADMIN — PRAVASTATIN SODIUM SCH MG: 80 TABLET ORAL at 20:28

## 2018-03-19 RX ADMIN — BENZTROPINE MESYLATE SCH MG: 2 TABLET ORAL at 15:17

## 2018-03-19 RX ADMIN — ESCITALOPRAM OXALATE SCH MG: 20 TABLET ORAL at 09:00

## 2018-03-19 RX ADMIN — HALOPERIDOL SCH MG: 10 TABLET ORAL at 17:45

## 2018-03-19 RX ADMIN — PROPRANOLOL HYDROCHLORIDE SCH MG: 10 TABLET ORAL at 09:04

## 2018-03-19 RX ADMIN — HALOPERIDOL SCH MG: 10 TABLET ORAL at 20:27

## 2018-03-19 RX ADMIN — NICOTINE SCH PATCH: 21 PATCH, EXTENDED RELEASE TOPICAL at 09:00

## 2018-03-19 RX ADMIN — HALOPERIDOL SCH MG: 10 TABLET ORAL at 13:06

## 2018-03-19 RX ADMIN — LEVOTHYROXINE SODIUM SCH MCG: 25 TABLET ORAL at 06:00

## 2018-03-19 RX ADMIN — HALOPERIDOL SCH MG: 10 TABLET ORAL at 09:04

## 2018-03-19 RX ADMIN — BENZTROPINE MESYLATE SCH MG: 2 TABLET ORAL at 09:00

## 2018-03-19 NOTE — HHI.PYPN
Subjective


Remarks


Patient seen and examined.  Chart reviewed.  Case discussed with nursing staff.

  On my examination today, the patient is somewhat more interactive.  I do note 

that he is now taking meals regularly.  He is marginally more conversant 

although he remains quite paranoid and withdrawn.  When I ask about auditory 

hallucinations he says "I don't want to discuss it."  He denies any visual 

hallucinations.  He denies any SI or HI.  Affect is dysphoric.  No side effects 

from medications.  No physical complaints.





Review of Systems


ROS Limitations:  Psychotic, Poor Historian


Except as stated in HPI:  all other systems reviewed are Neg





Mental Status Examination


Appearance:  Disheveled


Consciousness:  Alert


Orientation:  Person, Place


Motor Activity:  Other (resting tremor is at baseline.  No other motor 

abnormalities appreciated.)


Speech:  Hesitant, Slow


Attention and Concentration:  Easily Distracted


Mood:  Other (calm)


Affect:  Other (remains dysphoric)


Thought Content:  Delusional


Hallucination Type:  Auditory


Delusion Type:  Paranoid


Suicidal Ideation:  No


Homicidal Ideation:  No


Insight:  Poor


Judgment:  Poor





Results


Labs


Labs reviewed.  No new labs.  It appears that patient refused the labs I 

ordered on 3/17.


Vitals/IOs





Vital Signs








  Date Time  Temp Pulse Resp B/P (MAP) Pulse Ox O2 Delivery O2 Flow Rate FiO2


 


3/19/18 06:16 97.1 59 18 112/66 (81) 97   











Assessment & Plan


Problem List:  


(1) Schizophrenia


ICD Codes:  F20.9 - Schizophrenia, unspecified


Assessment & Plan


Some improvement with initiation of Haldol.  Titrate Haldol to 10 mg 4 times a 

day to target psychosis.  Check BMP.  Continue to monitor on high acuity unit.  

Continue other medications and care as ordered.


Justification for Cont. Inpt.


Medication changes.  Impairment in reality construction.  High risk for 

decompensation in less restrictive environment.


Discharge Planning


Pending psychiatric stabilization


Request HC Surrog/Guard Advoc?:  Yes





Problem Qualifiers





(1) Schizophrenia:  


Qualified Codes:  F20.0 - Paranoid schizophrenia








All Spring MD Mar 19, 2018 11:01

## 2018-03-20 VITALS
HEART RATE: 76 BPM | SYSTOLIC BLOOD PRESSURE: 126 MMHG | DIASTOLIC BLOOD PRESSURE: 82 MMHG | OXYGEN SATURATION: 99 % | TEMPERATURE: 98.1 F | RESPIRATION RATE: 18 BRPM

## 2018-03-20 VITALS
OXYGEN SATURATION: 98 % | TEMPERATURE: 98.6 F | HEART RATE: 64 BPM | DIASTOLIC BLOOD PRESSURE: 69 MMHG | RESPIRATION RATE: 16 BRPM | SYSTOLIC BLOOD PRESSURE: 131 MMHG

## 2018-03-20 LAB
BUN SERPL-MCNC: 14 MG/DL (ref 7–18)
CALCIUM SERPL-MCNC: 9.1 MG/DL (ref 8.5–10.1)
CHLORIDE SERPL-SCNC: 102 MEQ/L (ref 98–107)
CREAT SERPL-MCNC: 0.87 MG/DL (ref 0.6–1.3)
GFR SERPLBLD BASED ON 1.73 SQ M-ARVRAT: 93 ML/MIN (ref 89–?)
GLUCOSE SERPL-MCNC: 85 MG/DL (ref 74–106)
HCO3 BLD-SCNC: 33.4 MEQ/L (ref 21–32)
MAGNESIUM SERPL-MCNC: 2.3 MG/DL (ref 1.5–2.5)
PHOSPHATE SERPL-MCNC: 4.5 MG/DL (ref 2.5–4.9)
SODIUM SERPL-SCNC: 141 MEQ/L (ref 136–145)

## 2018-03-20 RX ADMIN — HALOPERIDOL SCH MG: 10 TABLET ORAL at 08:26

## 2018-03-20 RX ADMIN — PRAVASTATIN SODIUM SCH MG: 80 TABLET ORAL at 20:36

## 2018-03-20 RX ADMIN — LEVOTHYROXINE SODIUM SCH MCG: 25 TABLET ORAL at 05:02

## 2018-03-20 RX ADMIN — PROPRANOLOL HYDROCHLORIDE SCH MG: 10 TABLET ORAL at 08:26

## 2018-03-20 RX ADMIN — NICOTINE SCH PATCH: 21 PATCH, EXTENDED RELEASE TOPICAL at 09:00

## 2018-03-20 RX ADMIN — BENZTROPINE MESYLATE SCH MG: 2 TABLET ORAL at 08:26

## 2018-03-20 RX ADMIN — HALOPERIDOL SCH MG: 10 TABLET ORAL at 12:09

## 2018-03-20 RX ADMIN — HALOPERIDOL SCH MG: 10 TABLET ORAL at 20:36

## 2018-03-20 RX ADMIN — HALOPERIDOL SCH MG: 10 TABLET ORAL at 17:57

## 2018-03-20 RX ADMIN — BENZTROPINE MESYLATE SCH MG: 2 TABLET ORAL at 20:36

## 2018-03-20 RX ADMIN — BENZTROPINE MESYLATE SCH MG: 2 TABLET ORAL at 15:00

## 2018-03-20 NOTE — HHI.PYPN
Subjective


Remarks


Patient seen and examined with nurse.  Chart reviewed.  Case discussed with 

nursing staff.  Case discussed in treatment team.  On my exam, patient is once 

again more conversant.  He continues to exhibit some thought blocking but is 

better able to formulate and communicate his thoughts today.  He remains 

somewhat paranoid and internally stimulated.  He is unable to tolerate extended 

interview and says with finality "that's enough."  No side effects from 

medications.  Does complain of mild headache.  No other physical complaints.





Review of Systems


ROS Limitations:  Psychotic, Poor Historian


Except as stated in HPI:  all other systems reviewed are Neg





Mental Status Examination


Appearance:  Disheveled


Consciousness:  Alert


Orientation:  Person, Place


Motor Activity:  Other (resting tremor at baseline.  Patient declined physical 

exam today.)


Speech:  Slow


Language:  Adequate


Attention and Concentration:  Easily Distracted


Mood:  Other (calm)


Affect:  Other (mildly dysphoric)


Thought Process & Associations:  Intact


Thought Content:  Delusional


Hallucination Type:  Other (remains internally stimulated)


Delusion Type:  Paranoid (perhaps decreasing somewhat)


Suicidal Ideation:  No


Homicidal Ideation:  No


Insight:  Poor


Judgment:  Poor





Results


Labs











Test


  3/20/18


07:30


 


Blood Urea Nitrogen 14 MG/DL 


 


Creatinine 0.87 MG/DL 


 


Random Glucose 85 MG/DL 


 


Calcium Level 9.1 MG/DL 


 


Phosphorus Level 4.5 MG/DL 


 


Magnesium Level 2.3 MG/DL 


 


Sodium Level 141 MEQ/L 


 


Potassium Level 4.3 MEQ/L 


 


Chloride Level 102 MEQ/L 


 


Carbon Dioxide Level 33.4 MEQ/L 


 


Anion Gap 6 MEQ/L 


 


Estimat Glomerular Filtration


Rate 93 ML/MIN 


 





Labs reviewed


Vitals/IOs





Vital Signs








  Date Time  Temp Pulse Resp B/P (MAP) Pulse Ox O2 Delivery O2 Flow Rate FiO2


 


3/20/18 05:58 98.6 64 16 131/69 (89) 98   











Assessment & Plan


Problem List:  


(1) Schizophrenia


ICD Codes:  F20.9 - Schizophrenia, unspecified


Assessment & Plan


Continue Haldol 10 mg 4 times daily as ordered for now.  Possible titration of 

Haldol tomorrow to target psychotic symptoms.  Given the magnitude of the dose 

I will plan to make every-other-day adjustments to this med instead of daily 

adjustments to give patient time to acclimate to the dose.  Possible plan for 

Haldol Dec so long as this med provides adequate symptomatic relief.  Continue 

to monitor on the high acuity unit.  Continue other medications and care as 

ordered.


Justification for Cont. Inpt.


Impairment in reality construction.  Impairment in self-care.  High risk for 

decompensation in less restrictive environment.


Discharge Planning


Pending psychiatric stabilization.  Return to facility once stable.


Request HC Surrog/Guard Advoc?:  Yes





Problem Qualifiers





(1) Schizophrenia:  


Qualified Codes:  F20.0 - Paranoid schizophrenia








All Spring MD Mar 20, 2018 10:48

## 2018-03-21 VITALS
SYSTOLIC BLOOD PRESSURE: 113 MMHG | TEMPERATURE: 98.8 F | DIASTOLIC BLOOD PRESSURE: 67 MMHG | HEART RATE: 66 BPM | RESPIRATION RATE: 18 BRPM | OXYGEN SATURATION: 96 %

## 2018-03-21 VITALS
RESPIRATION RATE: 18 BRPM | OXYGEN SATURATION: 96 % | SYSTOLIC BLOOD PRESSURE: 108 MMHG | HEART RATE: 101 BPM | TEMPERATURE: 97.4 F | DIASTOLIC BLOOD PRESSURE: 72 MMHG

## 2018-03-21 VITALS — OXYGEN SATURATION: 98 %

## 2018-03-21 RX ADMIN — PRAVASTATIN SODIUM SCH MG: 80 TABLET ORAL at 21:09

## 2018-03-21 RX ADMIN — HALOPERIDOL SCH MG: 10 TABLET ORAL at 09:10

## 2018-03-21 RX ADMIN — BENZTROPINE MESYLATE SCH MG: 2 TABLET ORAL at 21:09

## 2018-03-21 RX ADMIN — PROPRANOLOL HYDROCHLORIDE SCH MG: 10 TABLET ORAL at 09:10

## 2018-03-21 RX ADMIN — BENZTROPINE MESYLATE SCH MG: 2 TABLET ORAL at 09:12

## 2018-03-21 RX ADMIN — BENZTROPINE MESYLATE SCH MG: 2 TABLET ORAL at 15:00

## 2018-03-21 RX ADMIN — HALOPERIDOL SCH MG: 5 TABLET ORAL at 18:00

## 2018-03-21 RX ADMIN — HALOPERIDOL SCH MG: 5 TABLET ORAL at 21:09

## 2018-03-21 RX ADMIN — NICOTINE SCH PATCH: 21 PATCH, EXTENDED RELEASE TOPICAL at 09:00

## 2018-03-21 RX ADMIN — LEVOTHYROXINE SODIUM SCH MCG: 25 TABLET ORAL at 05:47

## 2018-03-22 VITALS
SYSTOLIC BLOOD PRESSURE: 118 MMHG | OXYGEN SATURATION: 97 % | DIASTOLIC BLOOD PRESSURE: 72 MMHG | RESPIRATION RATE: 18 BRPM | HEART RATE: 73 BPM | TEMPERATURE: 97.8 F

## 2018-03-22 RX ADMIN — LEVOTHYROXINE SODIUM SCH MCG: 25 TABLET ORAL at 06:00

## 2018-03-22 RX ADMIN — HALOPERIDOL SCH MG: 5 TABLET ORAL at 08:57

## 2018-03-22 RX ADMIN — BENZTROPINE MESYLATE SCH MG: 2 TABLET ORAL at 08:55

## 2018-03-22 RX ADMIN — NICOTINE SCH PATCH: 21 PATCH, EXTENDED RELEASE TOPICAL at 09:00

## 2018-03-22 RX ADMIN — HALOPERIDOL SCH MG: 5 TABLET ORAL at 13:02

## 2018-03-22 RX ADMIN — PROPRANOLOL HYDROCHLORIDE SCH MG: 10 TABLET ORAL at 08:55
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
## 2018-08-20 NOTE — HHI.PYPN
Subjective


Remarks


Patient seen and examined.  Chart reviewed.  Patient eating fairly well.  Case 

discussed with nursing staff reports that the patient is somewhat suspicious of 

medications but is able to remain out in the day area for longer before 

retreating into his room.  On my examination today, the patient remains more 

interactive.  He is fairly paranoid still.  He denies any SI or HI.  Denies any 

AVH.  Denies any side effects from medications.  No physical complaints.





Review of Systems


ROS Limitations:  Psychotic, Poor Historian


Except as stated in HPI:  all other systems reviewed are Neg





Mental Status Examination


Appearance:  Disheveled


Consciousness:  Alert


Orientation:  Person, Place (at least)


Motor Activity:  Other (refuses physical exam.  No new motor abnormalities 

noted.)


Speech:  Unremarkable


Language:  Adequate


Attention and Concentration:  Easily Distracted


Mood:  Other (remains calm)


Affect:  Blunt


Thought Process & Associations:  Intact


Thought Content:  Delusional


Hallucination Type:  Other (internally preoccupied)


Delusion Type:  Paranoid (again perhaps decreasing somewhat)


Suicidal Ideation:  No


Homicidal Ideation:  No


Insight:  Poor


Judgment:  Poor





Results


Labs


Labs reviewed


Vitals/IOs





Vital Signs








  Date Time  Temp Pulse Resp B/P (MAP) Pulse Ox O2 Delivery O2 Flow Rate FiO2


 


3/21/18 07:35     98   


 


3/21/18 05:58 98.8 66 18 113/67 (82)    











Assessment & Plan


Problem List:  


(1) Schizophrenia


ICD Codes:  F20.9 - Schizophrenia, unspecified


Assessment & Plan


Titrate Haldol to 12.5 mg 4 times daily.  Patient does seem to be improving 

with this agent and so we might consider initiating Haldol Decanoate.  Continue 

to monitor on inpatient psychiatric unit.  Continue other medications and care 

as ordered.


Justification for Cont. Inpt.


Medication changes.  Risk for decompensation in less restrictive environment.


Discharge Planning


Possible return to facility sometime next week.


Request HC Surrog/Guard Advoc?:  Yes





Problem Qualifiers





(1) Schizophrenia:  


Qualified Codes:  F20.0 - Paranoid schizophrenia








All Spring MD Mar 21, 2018 10:19 no